# Patient Record
Sex: FEMALE | Employment: FULL TIME | URBAN - METROPOLITAN AREA
[De-identification: names, ages, dates, MRNs, and addresses within clinical notes are randomized per-mention and may not be internally consistent; named-entity substitution may affect disease eponyms.]

---

## 2024-03-12 ENCOUNTER — TRANSFERRED RECORDS (OUTPATIENT)
Dept: HEALTH INFORMATION MANAGEMENT | Facility: CLINIC | Age: 54
End: 2024-03-12
Payer: COMMERCIAL

## 2024-03-12 ENCOUNTER — MEDICAL CORRESPONDENCE (OUTPATIENT)
Dept: HEALTH INFORMATION MANAGEMENT | Facility: CLINIC | Age: 54
End: 2024-03-12
Payer: COMMERCIAL

## 2024-03-14 ENCOUNTER — TRANSCRIBE ORDERS (OUTPATIENT)
Dept: OTHER | Age: 54
End: 2024-03-14

## 2024-03-14 DIAGNOSIS — G89.29 CHRONIC NECK PAIN: Primary | ICD-10-CM

## 2024-03-14 DIAGNOSIS — R42 DIZZINESS AFTER EXTENSION OF NECK: ICD-10-CM

## 2024-03-14 DIAGNOSIS — G90.81 SEROTONIN SYNDROME: ICD-10-CM

## 2024-03-14 DIAGNOSIS — G43.711 CHRONIC MIGRAINE WITHOUT AURA, WITH INTRACTABLE MIGRAINE, SO STATED, WITH STATUS MIGRAINOSUS: ICD-10-CM

## 2024-03-14 DIAGNOSIS — M54.2 CHRONIC NECK PAIN: Primary | ICD-10-CM

## 2024-03-14 DIAGNOSIS — R42 DIZZINESS: ICD-10-CM

## 2024-03-14 DIAGNOSIS — H81.10 BENIGN PAROXYSMAL VERTIGO, UNSPECIFIED LATERALITY: ICD-10-CM

## 2024-08-13 NOTE — TELEPHONE ENCOUNTER
REASON FOR VISIT: Chronic neck pain    DATE OF APPT: 8/15/2024   NOTES (FOR ALL VISITS) STATUS DETAILS   OFFICE NOTE from referring provider [facility]  Bhumi Herrera, CNP   MEDICATION LIST N/A    IMAGING  (FOR ALL VISITS)     XR N/A    MRI (HEAD, NECK, SPINE) N/A    CT (HEAD, NECK, SPINE) N/A

## 2024-08-14 NOTE — PROGRESS NOTES
[facility]/[place]  Section of General Neurology  New Patient Visit      Yusra Lin MRN# [id number]   Age: 54 year old YOB: 1970              Assessment and Plan:   Assessment:  Yusra Lin is a 54 year old female who presents today for evaluation of  chronic neck pain, headaches and dizziness.  She has seen ENT, many neurologists closer to home with various but limited success she notes.  She has had MRI brain/C spine, MRA head/neck without obvious cause, worked with vestibular PT etc.   To review I do think these are multifactorial.  I agree with her ascertation that there could be a cervicogenic component.  This sounds musculoskeletal  in nature though she has failed some muscle relaxants, acupuncture in this regard.  Could consider other muscle relaxants, dedicated massage, dry needling, dedicated neck PT as other options.   She does have elements of BPPV in some aspects and vestibular PT has improved her symptoms to a degree.  Episodic dizziness is still profoundly limiting at times though.  Ativan PRN seems to be the most helpful.  Benzodiazepines scheduled have been helpful in similar situations in my experience. I would add this, could help anxiety which is a variable additionally.   I do think there is at least some element of acephalgic migraine given her rather profound photophobia/phonophobia and history of headaches this would be a different variable to target as these appear quite multifactorial/without obvious clear unifying singular explanation.    I encouraged her to continue a trial and error approach overall and outlines some future ideas for specialists or testing to consider as below, medication trials as well.  All questions answered.       Plan:  Klonopin to slowly uptitrate from 0.25 mg at bedtime to 0.5 mg BID as instructed, to try to minimize ativan use concurrently as able  Magnesium oxide 400 mg Riboflavin (vitamin b2) 400 mg daily from a migrainous  "perspective  To continue vestibular PT  Future ideas discussed:  Trialing other migrainous angles (imitrex eg)  Neuro otology referral (Dr. Mcqueen, information given)  [facility].           Luis Alberto Vidal MD   of Neurology   [facility]      History of Presenting Symptoms:   Yusra Lin is a 54 year old female who presents today for evaluation of  chronic neck pain, headaches and dizziness    Dizziness started one day out of the blue  Lightheaded, not room spinning, imbalance.    Worried about this,   Anxiety a variable  Feels drunk often times.   Feels like she is rocking in a boat  Has had \"crystals' fall out.  Plans to resume vestibular PT soon too.     Previous headaches  tension at times  Eyes hurt  Still sensitive to light and sound  Neck tightness a variable too.   Seeing [facility] for inner ear too of late.      Ativan helps, never klonopin    SSRIs--struggled with it, wellbutrin--both listed as allergies.    There was a worry about serotonin syndrome.      Vitamin D as helped  Hasn't tried imitrex  Excedrin migraine didn't help    She lives in [place]    Has an ENT appointment through [facility] tomorrow      Dr. Mcrae 2022 note reviewed  Parmjit Mcrae MD - 03/31/2022 11:00 AM CDT  Formatting of this note is different from the original.  History of Present Illness:  Yusra Lin is a 51yr old right handed female presenting for evaluation of headache and dizziness. The referring provider is Kathryn Juarez MD.    To review, in 2021 she developed pain in her left scapula and spine that was shooting. It slowly expanded into the neck and into a generalized headache. It worsened in November 2021. She described a lot of muscle tension in her neck and shoulders. She could get pain into her shoulder joints and into her proximal arms.    Around the same time her pain started she began having dizziness. It became worse in November 2021. When " "it was very severe she could not work. She described it as feeling very drunk. She felt unsteady when she would walk. Her vision felt blurry. There was no sense of rotation. Occasionally she felt presyncopal. There was associated photophobia. Lying down usually felt better than sitting or standing. The dizziness could be provoked by turning her head. It would last about 1 minute when it would occur. There was no associated hearing loss or tinnitus. There was no associated visual aura. There was associated photophobia, phonophobia, and nausea. The headaches have no associated complex neurologic features or autonomic dysfunction.    She had a prior history of migraine which was similar to how she felt starting in 2021, but she did not have the headache like her normal migraine.    She has had serotonin syndrome several times before.    She has been on the following abortive medications: promethazine 12.5 mg (did not take due to concerns about serotonin syndrome)    She has been on the following prophylactic medications: metoprolol ER 50 mg daily (for HTN), nortriptyline 10 mg QHS (did not take due to concerns about serotonin syndrome)    She has never had occipital nerve blocks or botulinum toxin chemodenervation.    Family history is negative for headaches.    Sleep: \"Horrible\". She has had chronic difficulty initiating and maintaining sleep since childhood. She is able to resume sleep once she wakes up, but she wakes frequently. She snores. She has daytime somnolence. She has been referred to sleep medicine but did not keep her appointment due to COVID19 infection.  Mood: Anxiety. Treated with lorazepam which helped her dizziness. She has had trouble with getting serotonin syndrome from various SSRIs. She gets vomiting, tremor, and hypertension from SSRIs. Her anxiety control is variable.  Caffeine: 2 cups of coffee in the morning, 1 diet soda daily. No energy drinks or tea.    MRI brain and cervical spine were " reported as unremarkable save for some mild degenerative disc disease with central canal stenosis or neural foraminal stenosis. She was evaluated by PT who did not find evidence for BPPV. Her PCP felt dizziness may be a major player in her dizziness. She was evaluated by Ashley Flynn MD of neurology at [facility] on 12/9/2021 for headache. She was felt to have chronic migraine and migrainous dizziness. Nortriptyline was initiated for migraine prophylaxis. She was referred to sleep clinic to rule out DAVID.    She has done chiropractic adjustment and massage therapy twice a week which did help with her musculoskeletal pain and dizziness. She has been evaluated by a spine specialist who recommended trigger point injections and botulinum toxin injections. Muscle relaxers have helped.    She has not been evaluated by pain medicine.       Assessment:  Musculoskeletal neck pain  Cervicogenic dizziness  Migraine headache without aura, not intractable without statis migrainosus  - she has a history of migraine headache  - her more recent dizziness appears to be mostly cervicogenic as there is associated substantial neck/shoulder pain and dizziness provoked by head movement. Reduction in musculoskeletal neck and shoulder pain also improves the dizziness. She has some migrainous symptoms but that is likely due to her musculoskeletal pain exacerbating her underlying migraine tendency. The primary problem appears to be the musculoskeletal issues in the neck.     Polyneuropathy  - exam shows decreased sensation to temperature and vibration sensation in the legs.   - Polyneuropathy is a condition in which the nerve endings in the distal limbs are injured or malfunctioning. This can slowly progress over time and in some cases cause problems to develop in the hands or impair walking and balance. 30% of neuropathies are hereditary, 30% are diabetic/prediabetic (she has prediabetes), and 30% are idiopathic (no identifiable  cause). There is no family history to suggest a hereditary cause. Most causes for neuropathy cannot be cured but symptoms of pain can be treated. Numbness and tingling do not have a treatment.   - this is unrelated to her dizziness and neck pain. This was an incidental finding.     Plan:  1) I will obtain SPEP with immunofixation (IFIX), fasting and 2 hour postprandial glucose test, MMA, and B12 to look for reversible causes of distal symmetric polyneuropathy. She could have these done in [place].   2) Follow up with spin clinic for trigger point injections.  3) Could consider pain clinic evaluation.  4) Since her issue seems to primarily be musculoskeletal I do not think she is going to benefit much from migraine prophylaxis.   5) I will plan to see her back in clinic as needed. she should of course feel free to call me in the interim if any questions or concerns arise, and I can reevaluate sooner if needed     [facility] walk in clinic note reviewed:  Assessment / Plan   Dizziness  - EKG to be done Now - In Office Visit    Pain of left lower extremity  - cyclobenzaprine (FLEXERIL) 5 mg tablet; Take 1 tablet (5 mg) by mouth Every 8 hours as needed for muscle spasm (left lower leg pain) for up to 10 days Dispense: 20 tablet; Refill: 0  - VENOUS DUPLEX LOWER EXTREMITY LT; Future    Plan:     Patient is reluctant to try recommendations for symptom control, states none of these have helped her in the past.     She has visit scheduled for 2 days from now for neurology, chronic vertigo- recommend she keep this visit. She declined anti-nausea medication and meclizine. She declined PT and eply maneuver referral/information. No signs of otitis media/externa.     EKG reviewed by me: NSR, HR 95bpm, no overt concerns, formal read pending     LLE: US to rule out DVT. Recommend continuing with sciatica nerve stretching, as she tolerates. Will contact patient with US results and will prescribe anticoagulation if necessary.  Prescribed flexeril PRN.     BP: Recommend patient check her BP twice daily- after medications and bring log to PCP. Discussed elevated BP in the setting of pain and axiety.     Recommend to follow up with PCP within 1 week if symptoms are not improving.     Addendum:   US of LLE was negative for DVT     No follow-ups on file.            HPI / History / ROS   Yusra Lin presents with  to walk in clinic with left groin/upper leg pain, right ear fullness, dizziness, palpitations, and high blood pressure.     She states her groin pain started 3 weeks ago and has been doing sciatica nerve pain maneuvers with no relief. Ibuprofen 400mg TID has not helped her groin pain - has been doing this the last 3 days. She denies history of sciatica pain. She reports having surgery ~1 month ago and has had decreased activity since.     She reports having chronic vertigo and this AM her dizziness significantly increased with feeling as though the room is spinning. Denies visual changes, numbness/tingling in limbs. In the past, she has tried eply maneuvers, PT, meclizine and seen neurology for vertigo with no relief. She has some nausea, reports that zofran and compazine do not work for her. Has no vomited. Does not have fever, chills, congestion, cough.     She started having palpitations once she was roomed today, these resolved during visit. Reports having high anxiety and is currently in pain (groin).     Her right ear has been full for 2 days. Denies ear discharge, pain or issues with left ear.     She took her metoprolol this AM and does not have a BP cuff at home. Denies visual changes, chest pain, shortness of breath.           HPI:     Yusra is a 54 year old right handed female who presents for a follow up appointment to discuss test results. Her vertigo symptoms seem to be improving some with vestibular rehab and vitamin D supplementation. She has her appointment with the [facility] at the [facility] in  "August. She has had increasing gastric reflux over the past several weeks that seemed worsened by omeprazole. She also had what felt like a superficial muscle twitching in her left breast. With her higher blood pressures lately, she has had some increased anxiety and worries about the gastric reflux symptoms and the muscle twitching possibly being cardiac related.     RESULTS:     1) MRI brain with and without contrast dated 4/12/24: No evidence of acute intracranial abnormality. Mild cerebral atrophy and chronic small vessel ischemic changes. Empty sella turcica. Low-lying cerebellar tonsils.     2) MRI cervical spine without contrast dated 4/12/24: Mild multilevel cervical spondylosis and degenerative disc disease resulting in minimal spinal stenosis at the C5-C6 level. Degenerative straightening of the normal cervical lordosis.     3) MRA head without contrast dated 4/12/24: No evidence of occlusion, significant stenosis, aneurysm, or dissection of the [place].     4) MRA neck with contrast dated 4/12/24: No evidence of occlusion, significant stenosis, aneurysm, or dissection of the cervical arterial structures.       Past Medical History:   There is no problem list on file for this patient.    No past medical history on file.     Past Surgical History:   No past surgical history on file.     Social History:         Family History:   No family history on file.     Medications:     No current outpatient medications on file.     No current facility-administered medications for this visit.        Allergies:   Not on File     Review of Systems:   As noted above     Physical Exam:   Vitals: /85 (BP Location: Right arm, Patient Position: Sitting, Cuff Size: Adult Regular)   Pulse 90   Ht 1.676 m (5' 6\")   Wt 105.7 kg (233 lb)   BMI 37.61 kg/m         Neuro:   General Appearance: No apparent distress, well-nourished, well-groomed, pleasant     Mental Status: Alert and oriented to person, place, and " time. Speech fluent and comprehension intact. No dysarthria.      Cranial Nerves:   II: Visual fields: normal  III: Pupils: 3 mm, equal, round, reactive to light   III,IV,VI: Extraocular Movements: intact without clear nystagmus  V: Facial sensation: intact to light touch  VII: Facial strength: intact without asymmetry      Motor Exam:   5/5 diffusely     Sensory: intact to light touch    Coordination: no dysmetria noted    Reflexes: biceps, triceps, brachioradialis, patellar 2+ and symmetric.            Data: Pertinent prior to visit   Imaging:  MRI BRAIN WITH AND WITHOUT CONTRAST: 4/12/2024 12:17 CDT     Clinical information: ICD-10 M54.2 Chronic neck pain   ICD-10 G89.29 Chronic neck pain   ICD-10 G43.711 Chronic migraine without aura, with intractable migraine, so stated, with status migrainosus   ICD-10 R42 Dizziness after extension of neck   ICD-10 H81.10 Benign paroxysmal po,     Comparison: Head MRI performed on 7/7/2023     Technique: Multiplanar, multisequence MR imaging of the head was performed both before and after the administration of gadolinium based IV contrast.       FINDINGS:   No evidence of acute intracranial abnormality. Specifically, there is no evidence of acute infarct, hemorrhage, hydrocephalus, mass, mass effect, midline shift, or extra-axial collections. No areas of abnormal restricted diffusion or enhancement.     Minimal to mild generalized cerebral and cerebellar atrophy. Mild patchy T2 hyperintensity in the subcortical and periventricular white matter, compatible with chronic small vessel ischemic changes.     An empty sella turcica is noted.     Low-lying cerebellar tonsils.     No acute findings involving the orbits.     Paranasal sinuses and mastoid air cells are clear.     Calvarium is intact.       IMPRESSION:   1. No evidence of acute intracranial abnormality.   2.  Mild cerebral atrophy and chronic small vessel ischemic changes.   3.  Empty sella turcica.   4.  Low-lying  cerebellar tonsils.       FIDE HE    YOB: 1970    Procedure: MRI SPINE CERVICAL WITHOUT CONTRAST    Date of Service: 04/12/2024       MRI SPINE CERVICAL WITHOUT CONTRAST: 4/12/2024 12:16 CDT     Clinical information: ICD-10 M54.2 Chronic neck pain   ICD-10 G89.29 Chronic neck pain,     Comparison: Cervical MRI from 7/27/2022     Technique: Multiplanar, multisequence MR imaging of the cervical spine was performed without the use of IV contrast.       FINDINGS:     Osseous Structures: Marrow signal is age-appropriate. No fractures or aggressive osseous lesions. Degenerative straightening of the normal cervical lordosis.     Cord: No syrinx or myomalacia of the visualized portions. Normal cord signal.     Atlantoaxial and atlantooccipital joints: Mild degenerative changes. Otherwise intact.     Intervertebral spaces/degenerative:   At C2-C3, minimal right-sided facet arthropathy is present without significant disc bulge or spinal stenosis.     At C3-C4, a tiny central posterior disc protrusion and minimal facet arthropathy are present without significant spinal stenosis.     At C4-C5, minimal facet arthropathy is present without significant spinal stenosis.     At C5-C6, a minimal posterior disc bulge, minimal facet arthropathy, and minimal uncovertebral joint degenerative changes result in minimal left neural foraminal and central canal narrowing.     At C6-C7, no significant disc bulge, degenerative changes or spinal stenosis is noted.     At C7-T1, no significant disc bulge, degenerative changes or spinal stenosis is noted.       Soft tissues: Within normal limits.       IMPRESSION:   1. Mild multilevel cervical spondylosis and degenerative disc disease resulting in minimal spinal stenosis at the C5-C6 level..   2.  Degenerative straightening of the normal cervical lordosis.       MRA HEAD WITHOUT CONTRAST, MRA NECK WITH CONTRAST: 4/12/2024 12:17 CDT     INDICATION: ICD-10 M54.2 Chronic neck  pain   ICD-10 G89.29 Chronic neck pain   ICD-10 G43.711 Chronic migraine without aura, with intractable migraine, so stated, with status migrainosus   ICD-10 R42 Dizziness after extension of neck   ICD-10 H81.10 Benign paroxysmal po     COMPARISON: CTA head and neck performed on 3/12/2024.     TECHNIQUE: MR imaging of the head was performed without contrast utilizing time-of-flight technique. MR imaging of the neck was performed with and without contrast. MIP and 3-D reformats were obtained and reviewed.     MRA HEAD WITHOUT CONTRAST:     FINDINGS:   Basilar artery is intact.     Posterior cerebral arteries are intact.. Right posterior communicating artery is intact. Left posterior communicating artery is not visualized and may be congenitally absent or hypoplastic.     Internal carotid arteries are intact.     Middle cerebral arteries are intact.     Anterior cerebral arteries are intact. Anterior communicating artery is intact.     IMPRESSION:   No evidence of occlusion, significant stenosis, aneurysm, or dissection of the [place].       MRA NECK WITH AND WITHOUT CONTRAST:     FINDINGS:   Aortic arch is intact.     Innominate artery is intact.     Subclavian arteries are intact.     Dominant left vertebral artery. Visualized portions of the vertebral arteries are intact, however the proximal portions of both vertebral arteries and the distal portion of the right vertebral artery are suboptimally visualized.     Common carotid arteries are intact.     Carotid bulbs are intact.     Internal and external carotid arteries are intact.     IMPRESSION:   No evidence of occlusion, significant stenosis, aneurysm, or dissection of the cervical arterial structures.                  The total time of this encounter today amounted to 67 minutes. This time included time spent with the patient, prep work, ordering tests, and performing post visit documentation.

## 2024-08-15 ENCOUNTER — TELEPHONE (OUTPATIENT)
Dept: NEUROLOGY | Facility: CLINIC | Age: 54
End: 2024-08-15

## 2024-08-15 ENCOUNTER — OFFICE VISIT (OUTPATIENT)
Dept: NEUROLOGY | Facility: CLINIC | Age: 54
End: 2024-08-15
Payer: COMMERCIAL

## 2024-08-15 ENCOUNTER — PRE VISIT (OUTPATIENT)
Dept: NEUROLOGY | Facility: CLINIC | Age: 54
End: 2024-08-15

## 2024-08-15 VITALS
BODY MASS INDEX: 37.45 KG/M2 | DIASTOLIC BLOOD PRESSURE: 85 MMHG | HEIGHT: 66 IN | WEIGHT: 233 LBS | SYSTOLIC BLOOD PRESSURE: 135 MMHG | HEART RATE: 90 BPM

## 2024-08-15 DIAGNOSIS — F41.9 ANXIETY: Primary | ICD-10-CM

## 2024-08-15 DIAGNOSIS — M54.2 CHRONIC NECK PAIN: ICD-10-CM

## 2024-08-15 DIAGNOSIS — H81.10 BENIGN PAROXYSMAL VERTIGO, UNSPECIFIED LATERALITY: ICD-10-CM

## 2024-08-15 DIAGNOSIS — R42 DIZZINESS AFTER EXTENSION OF NECK: ICD-10-CM

## 2024-08-15 DIAGNOSIS — R42 DIZZINESS: ICD-10-CM

## 2024-08-15 DIAGNOSIS — G89.29 CHRONIC NECK PAIN: ICD-10-CM

## 2024-08-15 DIAGNOSIS — G43.711 CHRONIC MIGRAINE WITHOUT AURA, WITH INTRACTABLE MIGRAINE, SO STATED, WITH STATUS MIGRAINOSUS: ICD-10-CM

## 2024-08-15 PROCEDURE — 99205 OFFICE O/P NEW HI 60 MIN: CPT | Performed by: STUDENT IN AN ORGANIZED HEALTH CARE EDUCATION/TRAINING PROGRAM

## 2024-08-15 RX ORDER — KETOCONAZOLE 20 MG/G
CREAM TOPICAL
COMMUNITY
Start: 2024-04-11

## 2024-08-15 RX ORDER — METOPROLOL SUCCINATE 50 MG/1
1 TABLET, EXTENDED RELEASE ORAL 2 TIMES DAILY
COMMUNITY
Start: 2023-06-16

## 2024-08-15 RX ORDER — VITAMIN B COMPLEX
2000 TABLET ORAL
COMMUNITY

## 2024-08-15 RX ORDER — MECLIZINE HYDROCHLORIDE 25 MG/1
TABLET ORAL
COMMUNITY
Start: 2023-11-05

## 2024-08-15 RX ORDER — LORAZEPAM 0.5 MG/1
TABLET ORAL
COMMUNITY
Start: 2023-12-12

## 2024-08-15 RX ORDER — CLONAZEPAM 0.5 MG/1
0.5 TABLET ORAL 2 TIMES DAILY
Qty: 60 TABLET | Refills: 5 | Status: SHIPPED | OUTPATIENT
Start: 2024-08-15

## 2024-08-15 RX ORDER — CYCLOBENZAPRINE HCL 5 MG
5 TABLET ORAL
COMMUNITY
Start: 2024-08-13 | End: 2024-08-23

## 2024-08-15 RX ORDER — ALBUTEROL SULFATE 90 UG/1
2 AEROSOL, METERED RESPIRATORY (INHALATION)
COMMUNITY
Start: 2024-01-01

## 2024-08-15 NOTE — PATIENT INSTRUCTIONS
Magnesium oxide 400 mg Riboflavin (vitamin b2) 400 mg daily  Dr Mcqueen --Neuro otology    Imitrex (sumatriptan) as needed an option   Klonopin--start (1/2 tab) 0.25 mg at night x1 week then go to half tab twice a day x1 week build up to a tab twice a day as tolerated or needed    [facility] future option

## 2024-08-15 NOTE — LETTER
8/15/2024      Yusra Lin  [address] [facility] [address]      Dear Colleague,    Thank you for referring your patient, Yusra Lin, to the [facility] NEUROLOGY [facility] [place]. Please see a copy of my visit note below.    [facility]  Section of General Neurology  New Patient Visit      Yusra Lin MRN# [id number]   Age: 54 year old YOB: 1970              Assessment and Plan:   Assessment:  Yusra Lin is a 54 year old female who presents today for evaluation of  chronic neck pain, headaches and dizziness.  She has seen ENT, many neurologists closer to home with various but limited success she notes.  She has had MRI brain/C spine, MRA head/neck without obvious cause, worked with vestibular PT etc.   To review I do think these are multifactorial.  I agree with her ascertation that there could be a cervicogenic component.  This sounds musculoskeletal  in nature though she has failed some muscle relaxants, acupuncture in this regard.  Could consider other muscle relaxants, dedicated massage, dry needling, dedicated neck PT as other options.   She does have elements of BPPV in some aspects and vestibular PT has improved her symptoms to a degree.  Episodic dizziness is still profoundly limiting at times though.  Ativan PRN seems to be the most helpful.  Benzodiazepines scheduled have been helpful in similar situations in my experience. I would add this, could help anxiety which is a variable additionally.   I do think there is at least some element of acephalgic migraine given her rather profound photophobia/phonophobia and history of headaches this would be a different variable to target as these appear quite multifactorial/without obvious clear unifying singular explanation.    I encouraged her to continue a trial and error approach overall and outlines some future ideas for specialists or testing to consider as below, medication trials as well.  All  "questions answered.       Plan:  Klonopin to slowly uptitrate from 0.25 mg at bedtime to 0.5 mg BID as instructed, to try to minimize ativan use concurrently as able  Magnesium oxide 400 mg Riboflavin (vitamin b2) 400 mg daily from a migrainous perspective  To continue vestibular PT  Future ideas discussed:  Trialing other migrainous angles (imitrex eg)  Neuro otology referral (Dr. Mcqueen, information given)  [facility].           Luis Alberto Vidal MD   of Neurology   [facility]      History of Presenting Symptoms:   Yusra Lin is a 54 year old female who presents today for evaluation of  chronic neck pain, headaches and dizziness    Dizziness started one day out of the blue  Lightheaded, not room spinning, imbalance.    Worried about this,   Anxiety a variable  Feels drunk often times.   Feels like she is rocking in a boat  Has had \"crystals' fall out.  Plans to resume vestibular PT soon too.     Previous headaches  tension at times  Eyes hurt  Still sensitive to light and sound  Neck tightness a variable too.   Seeing [facility] for inner ear too of late.      Ativan helps, never klonopin    SSRIs--struggled with it, wellbutrin--both listed as allergies.    There was a worry about serotonin syndrome.      Vitamin D as helped  Hasn't tried imitrex  Excedrin migraine didn't help    She lives in [place]    Has an ENT appointment through [facility] tomorrow      Dr. Mcrae 2022 note reviewed  Parmjit Mcrae MD - 03/31/2022 11:00 AM CDT  Formatting of this note is different from the original.  History of Present Illness:  Yusra Lin is a 51yr old right handed female presenting for evaluation of headache and dizziness. The referring provider is Kathryn Juarez MD.    To review, in 2021 she developed pain in her left scapula and spine that was shooting. It slowly expanded into the neck and into a generalized headache. It worsened in November 2021. " "She described a lot of muscle tension in her neck and shoulders. She could get pain into her shoulder joints and into her proximal arms.    Around the same time her pain started she began having dizziness. It became worse in November 2021. When it was very severe she could not work. She described it as feeling very drunk. She felt unsteady when she would walk. Her vision felt blurry. There was no sense of rotation. Occasionally she felt presyncopal. There was associated photophobia. Lying down usually felt better than sitting or standing. The dizziness could be provoked by turning her head. It would last about 1 minute when it would occur. There was no associated hearing loss or tinnitus. There was no associated visual aura. There was associated photophobia, phonophobia, and nausea. The headaches have no associated complex neurologic features or autonomic dysfunction.    She had a prior history of migraine which was similar to how she felt starting in 2021, but she did not have the headache like her normal migraine.    She has had serotonin syndrome several times before.    She has been on the following abortive medications: promethazine 12.5 mg (did not take due to concerns about serotonin syndrome)    She has been on the following prophylactic medications: metoprolol ER 50 mg daily (for HTN), nortriptyline 10 mg QHS (did not take due to concerns about serotonin syndrome)    She has never had occipital nerve blocks or botulinum toxin chemodenervation.    Family history is negative for headaches.    Sleep: \"Horrible\". She has had chronic difficulty initiating and maintaining sleep since childhood. She is able to resume sleep once she wakes up, but she wakes frequently. She snores. She has daytime somnolence. She has been referred to sleep medicine but did not keep her appointment due to COVID19 infection.  Mood: Anxiety. Treated with lorazepam which helped her dizziness. She has had trouble with getting serotonin " syndrome from various SSRIs. She gets vomiting, tremor, and hypertension from SSRIs. Her anxiety control is variable.  Caffeine: 2 cups of coffee in the morning, 1 diet soda daily. No energy drinks or tea.    MRI brain and cervical spine were reported as unremarkable save for some mild degenerative disc disease with central canal stenosis or neural foraminal stenosis. She was evaluated by PT who did not find evidence for BPPV. Her PCP felt dizziness may be a major player in her dizziness. She was evaluated by Ashley Flynn MD of neurology at [facility] on 12/9/2021 for headache. She was felt to have chronic migraine and migrainous dizziness. Nortriptyline was initiated for migraine prophylaxis. She was referred to sleep clinic to rule out DAVID.    She has done chiropractic adjustment and massage therapy twice a week which did help with her musculoskeletal pain and dizziness. She has been evaluated by a spine specialist who recommended trigger point injections and botulinum toxin injections. Muscle relaxers have helped.    She has not been evaluated by pain medicine.       Assessment:  Musculoskeletal neck pain  Cervicogenic dizziness  Migraine headache without aura, not intractable without statis migrainosus  - she has a history of migraine headache  - her more recent dizziness appears to be mostly cervicogenic as there is associated substantial neck/shoulder pain and dizziness provoked by head movement. Reduction in musculoskeletal neck and shoulder pain also improves the dizziness. She has some migrainous symptoms but that is likely due to her musculoskeletal pain exacerbating her underlying migraine tendency. The primary problem appears to be the musculoskeletal issues in the neck.     Polyneuropathy  - exam shows decreased sensation to temperature and vibration sensation in the legs.   - Polyneuropathy is a condition in which the nerve endings in the distal limbs are injured or malfunctioning. This can  slowly progress over time and in some cases cause problems to develop in the hands or impair walking and balance. 30% of neuropathies are hereditary, 30% are diabetic/prediabetic (she has prediabetes), and 30% are idiopathic (no identifiable cause). There is no family history to suggest a hereditary cause. Most causes for neuropathy cannot be cured but symptoms of pain can be treated. Numbness and tingling do not have a treatment.   - this is unrelated to her dizziness and neck pain. This was an incidental finding.     Plan:  1) I will obtain SPEP with immunofixation (IFIX), fasting and 2 hour postprandial glucose test, MMA, and B12 to look for reversible causes of distal symmetric polyneuropathy. She could have these done in [place].   2) Follow up with [facility] clinic for trigger point injections.  3) Could consider pain clinic evaluation.  4) Since her issue seems to primarily be musculoskeletal I do not think she is going to benefit much from migraine prophylaxis.   5) I will plan to see her back in clinic as needed. she should of course feel free to call me in the interim if any questions or concerns arise, and I can reevaluate sooner if needed     [facility] walk in clinic note reviewed:  Assessment / Plan   Dizziness  - EKG to be done Now - In Office Visit    Pain of left lower extremity  - cyclobenzaprine (FLEXERIL) 5 mg tablet; Take 1 tablet (5 mg) by mouth Every 8 hours as needed for muscle spasm (left lower leg pain) for up to 10 days Dispense: 20 tablet; Refill: 0  - VENOUS DUPLEX LOWER EXTREMITY LT; Future    Plan:     Patient is reluctant to try recommendations for symptom control, states none of these have helped her in the past.     She has visit scheduled for 2 days from now for neurology, chronic vertigo- recommend she keep this visit. She declined anti-nausea medication and meclizine. She declined PT and eply maneuver referral/information. No signs of otitis media/externa.     EKG reviewed by me:  NSR, HR 95bpm, no overt concerns, formal read pending     LLE: US to rule out DVT. Recommend continuing with sciatica nerve stretching, as she tolerates. Will contact patient with US results and will prescribe anticoagulation if necessary. Prescribed flexeril PRN.     BP: Recommend patient check her BP twice daily- after medications and bring log to PCP. Discussed elevated BP in the setting of pain and axiety.     Recommend to follow up with PCP within 1 week if symptoms are not improving.     Addendum:   US of LLE was negative for DVT     No follow-ups on file.            HPI / History / ROS   Yusra Lin presents with  to walk in clinic with left groin/upper leg pain, right ear fullness, dizziness, palpitations, and high blood pressure.     She states her groin pain started 3 weeks ago and has been doing sciatica nerve pain maneuvers with no relief. Ibuprofen 400mg TID has not helped her groin pain - has been doing this the last 3 days. She denies history of sciatica pain. She reports having surgery ~1 month ago and has had decreased activity since.     She reports having chronic vertigo and this AM her dizziness significantly increased with feeling as though the room is spinning. Denies visual changes, numbness/tingling in limbs. In the past, she has tried eply maneuvers, PT, meclizine and seen neurology for vertigo with no relief. She has some nausea, reports that zofran and compazine do not work for her. Has no vomited. Does not have fever, chills, congestion, cough.     She started having palpitations once she was roomed today, these resolved during visit. Reports having high anxiety and is currently in pain (groin).     Her right ear has been full for 2 days. Denies ear discharge, pain or issues with left ear.     She took her metoprolol this AM and does not have a BP cuff at home. Denies visual changes, chest pain, shortness of breath.           HPI:     Yusra is a 54 year old right handed female who  presents for a follow up appointment to discuss test results. Her vertigo symptoms seem to be improving some with vestibular rehab and vitamin D supplementation. She has her appointment with the Autonomic Specialist at the [facility] in August. She has had increasing gastric reflux over the past several weeks that seemed worsened by omeprazole. She also had what felt like a superficial muscle twitching in her left breast. With her higher blood pressures lately, she has had some increased anxiety and worries about the gastric reflux symptoms and the muscle twitching possibly being cardiac related.     RESULTS:     1) MRI brain with and without contrast dated 4/12/24: No evidence of acute intracranial abnormality. Mild cerebral atrophy and chronic small vessel ischemic changes. Empty sella turcica. Low-lying cerebellar tonsils.     2) MRI cervical spine without contrast dated 4/12/24: Mild multilevel cervical spondylosis and degenerative disc disease resulting in minimal spinal stenosis at the C5-C6 level. Degenerative straightening of the normal cervical lordosis.     3) MRA head without contrast dated 4/12/24: No evidence of occlusion, significant stenosis, aneurysm, or dissection of the [place].     4) MRA neck with contrast dated 4/12/24: No evidence of occlusion, significant stenosis, aneurysm, or dissection of the cervical arterial structures.       Past Medical History:   There is no problem list on file for this patient.    No past medical history on file.     Past Surgical History:   No past surgical history on file.     Social History:         Family History:   No family history on file.     Medications:     No current outpatient medications on file.     No current facility-administered medications for this visit.        Allergies:   Not on File     Review of Systems:   As noted above     Physical Exam:   Vitals: /85 (BP Location: Right arm, Patient Position: Sitting, Cuff Size: Adult Regular)    "Pulse 90   Ht 1.676 m (5' 6\")   Wt 105.7 kg (233 lb)   BMI 37.61 kg/m         Neuro:   General Appearance: No apparent distress, well-nourished, well-groomed, pleasant     Mental Status: Alert and oriented to person, place, and time. Speech fluent and comprehension intact. No dysarthria.      Cranial Nerves:   II: Visual fields: normal  III: Pupils: 3 mm, equal, round, reactive to light   III,IV,VI: Extraocular Movements: intact without clear nystagmus  V: Facial sensation: intact to light touch  VII: Facial strength: intact without asymmetry      Motor Exam:   5/5 diffusely     Sensory: intact to light touch    Coordination: no dysmetria noted    Reflexes: biceps, triceps, brachioradialis, patellar 2+ and symmetric.            Data: Pertinent prior to visit   Imaging:  MRI BRAIN WITH AND WITHOUT CONTRAST: 4/12/2024 12:17 CDT     Clinical information: ICD-10 M54.2 Chronic neck pain   ICD-10 G89.29 Chronic neck pain   ICD-10 G43.711 Chronic migraine without aura, with intractable migraine, so stated, with status migrainosus   ICD-10 R42 Dizziness after extension of neck   ICD-10 H81.10 Benign paroxysmal po,     Comparison: Head MRI performed on 7/7/2023     Technique: Multiplanar, multisequence MR imaging of the head was performed both before and after the administration of gadolinium based IV contrast.       FINDINGS:   No evidence of acute intracranial abnormality. Specifically, there is no evidence of acute infarct, hemorrhage, hydrocephalus, mass, mass effect, midline shift, or extra-axial collections. No areas of abnormal restricted diffusion or enhancement.     Minimal to mild generalized cerebral and cerebellar atrophy. Mild patchy T2 hyperintensity in the subcortical and periventricular white matter, compatible with chronic small vessel ischemic changes.     An empty sella turcica is noted.     Low-lying cerebellar tonsils.     No acute findings involving the orbits.     Paranasal sinuses and mastoid air " cells are clear.     Calvarium is intact.       IMPRESSION:   1. No evidence of acute intracranial abnormality.   2.  Mild cerebral atrophy and chronic small vessel ischemic changes.   3.  Empty sella turcica.   4.  Low-lying cerebellar tonsils.       FIDE HE    YOB: 1970    Procedure: MRI SPINE CERVICAL WITHOUT CONTRAST    Date of Service: 04/12/2024       MRI SPINE CERVICAL WITHOUT CONTRAST: 4/12/2024 12:16 CDT     Clinical information: ICD-10 M54.2 Chronic neck pain   ICD-10 G89.29 Chronic neck pain,     Comparison: Cervical MRI from 7/27/2022     Technique: Multiplanar, multisequence MR imaging of the cervical spine was performed without the use of IV contrast.       FINDINGS:     Osseous Structures: Marrow signal is age-appropriate. No fractures or aggressive osseous lesions. Degenerative straightening of the normal cervical lordosis.     Cord: No syrinx or myomalacia of the visualized portions. Normal cord signal.     Atlantoaxial and atlantooccipital joints: Mild degenerative changes. Otherwise intact.     Intervertebral spaces/degenerative:   At C2-C3, minimal right-sided facet arthropathy is present without significant disc bulge or spinal stenosis.     At C3-C4, a tiny central posterior disc protrusion and minimal facet arthropathy are present without significant spinal stenosis.     At C4-C5, minimal facet arthropathy is present without significant spinal stenosis.     At C5-C6, a minimal posterior disc bulge, minimal facet arthropathy, and minimal uncovertebral joint degenerative changes result in minimal left neural foraminal and central canal narrowing.     At C6-C7, no significant disc bulge, degenerative changes or spinal stenosis is noted.     At C7-T1, no significant disc bulge, degenerative changes or spinal stenosis is noted.       Soft tissues: Within normal limits.       IMPRESSION:   1. Mild multilevel cervical spondylosis and degenerative disc disease resulting in  minimal spinal stenosis at the C5-C6 level..   2.  Degenerative straightening of the normal cervical lordosis.       MRA HEAD WITHOUT CONTRAST, MRA NECK WITH CONTRAST: 4/12/2024 12:17 CDT     INDICATION: ICD-10 M54.2 Chronic neck pain   ICD-10 G89.29 Chronic neck pain   ICD-10 G43.711 Chronic migraine without aura, with intractable migraine, so stated, with status migrainosus   ICD-10 R42 Dizziness after extension of neck   ICD-10 H81.10 Benign paroxysmal po     COMPARISON: CTA head and neck performed on 3/12/2024.     TECHNIQUE: MR imaging of the head was performed without contrast utilizing time-of-flight technique. MR imaging of the neck was performed with and without contrast. MIP and 3-D reformats were obtained and reviewed.     MRA HEAD WITHOUT CONTRAST:     FINDINGS:   Basilar artery is intact.     Posterior cerebral arteries are intact.. Right posterior communicating artery is intact. Left posterior communicating artery is not visualized and may be congenitally absent or hypoplastic.     Internal carotid arteries are intact.     Middle cerebral arteries are intact.     Anterior cerebral arteries are intact. Anterior communicating artery is intact.     IMPRESSION:   No evidence of occlusion, significant stenosis, aneurysm, or dissection of the [place].       MRA NECK WITH AND WITHOUT CONTRAST:     FINDINGS:   Aortic arch is intact.     Innominate artery is intact.     Subclavian arteries are intact.     Dominant left vertebral artery. Visualized portions of the vertebral arteries are intact, however the proximal portions of both vertebral arteries and the distal portion of the right vertebral artery are suboptimally visualized.     Common carotid arteries are intact.     Carotid bulbs are intact.     Internal and external carotid arteries are intact.     IMPRESSION:   No evidence of occlusion, significant stenosis, aneurysm, or dissection of the cervical arterial structures.                  The  total time of this encounter today amounted to 67 minutes. This time included time spent with the patient, prep work, ordering tests, and performing post visit documentation.      Again, thank you for allowing me to participate in the care of your patient.        Sincerely,        Usman Vidal MD

## 2024-08-15 NOTE — TELEPHONE ENCOUNTER
Health Call Center    Phone Message    May a detailed message be left on voicemail: no     Reason for Call: Medication Question or concern regarding medication   Prescription Clarification  Name of Medication:   clonazePAM (KLONOPIN) 0.5 MG tablet     Prescribing Provider:   Usman Vidal     Pharmacy:   [facility] [address] [place] [address]       What on the order needs clarification?   Yolanda with [facility] pharmacy is requesting a call back to further discuss the medication above.   Is the medication listed above replacing Lorazepam that was recently filled on 7/11/24? Would like to further discuss.     Please advise  [phone]      Action Taken: Other: Neurology    Travel Screening: Not Applicable

## 2024-08-15 NOTE — NURSING NOTE
"Yusra Lin's goals for this visit include:   Chief Complaint   Patient presents with    New Patient     Migraines etc       She requests these members of her care team be copied on today's visit information: yes    PCP: Janneth Renee    Referring Provider:  Bhumi Herrera, CNP  [address] [place] [address]    /85 (BP Location: Right arm, Patient Position: Sitting, Cuff Size: Adult Regular)   Pulse 90   Ht 1.676 m (5' 6\")   Wt 105.7 kg (233 lb)   BMI 37.61 kg/m      Do you need any medication refills at today's visit? No  MONIE Wood, CMA ([facility])      "

## 2024-10-06 ENCOUNTER — HEALTH MAINTENANCE LETTER (OUTPATIENT)
Age: 54
End: 2024-10-06

## 2025-08-31 ENCOUNTER — HEALTH MAINTENANCE LETTER (OUTPATIENT)
Age: 55
End: 2025-08-31